# Patient Record
Sex: MALE | Race: WHITE | NOT HISPANIC OR LATINO | URBAN - METROPOLITAN AREA
[De-identification: names, ages, dates, MRNs, and addresses within clinical notes are randomized per-mention and may not be internally consistent; named-entity substitution may affect disease eponyms.]

---

## 2017-11-10 ENCOUNTER — ALLSCRIPTS OFFICE VISIT (OUTPATIENT)
Dept: OTHER | Facility: OTHER | Age: 52
End: 2017-11-10

## 2017-11-10 DIAGNOSIS — Z12.11 ENCOUNTER FOR SCREENING FOR MALIGNANT NEOPLASM OF COLON: ICD-10-CM

## 2017-11-11 NOTE — PROGRESS NOTES
Assessment    1  Tick bite of back, initial encounter (911 4,E906 4) (U16 265Y,T77  XXXA)   2  Screening for colon cancer (V76 51) (Z12 11)   3  Need for tetanus booster (V03 7) (Z23)    Plan  Need for tetanus booster    · Tdap (Adacel)  Given that there was no EM, the tick was on for less than 24 hours, the patient has no symptoms of Lyme we will not treat at this time  I did provide him with a handout on Lyme disease symptoms from the [facility] and he will call if he develops anything at which point I would start him on doxycycline  He was given FIT cards today as he does not want to get a colonoscopy but is due for colon cancer screening  He is aware that if these are positive he will need a colonoscopy  He was also given an updated Adacel today, he did decline a flu vaccine  Discussion/Summary  Possible side effects of new medications were reviewed with the patient/guardian today  The treatment plan was reviewed with the patient/guardian  The patient/guardian understands and agrees with the treatment plan      Chief Complaint  Pt here with deer tick on his left leg  He found it Wednesday night and doesn't believe it was there on Wednesday morning  History of Present Illness  HPI: the pt is here today because he found a tick on his left upper leg 2 days agois almost certain the tick was not there earlier that dayhad a small red kellen where the tick bit himput some cream on it that he had at home and by the next day was gonehas got no symptoms of Lyme diseasewas no EMheadaches or joint painNo fevers or chills      Past Medical History  1  History of Acute Pneumonitis (486)   2  History of Bronchitis, asthmatic (493 90) (J45 909)   3  History of epididymitis (V13 89) (Z87 438)   4  History of pilonidal cyst (V13 3) (Z87 2)   5  History of Spermatocele (608 1) (N43 40)   6  History of Traumatic Pneumothorax (860 0)  Active Problems And Past Medical History Reviewed:    The active problems and past medical history were reviewed and updated today  Family History  Family History    1  Family history of No Significant Family History  Family History Reviewed: The family history was reviewed and updated today  Social History   · Being A Social Drinker   · Current Smoker (305 1)   · Former smoker (O38 40) (T21 582)   · History of Former smoker (V15 82) (Z87 891)   · Nonsmoker (V49 89) (Z78 9)  The social history was reviewed and updated today  Surgical History    1  Denied: History Of Prior Surgery  Surgical History Reviewed: The surgical history was reviewed and updated today  Current Meds   1  Daily Mens Health Formula Oral Tablet; take 1/2 tablet daily; Therapy: 88FCM9010 to Recorded   2  Pantoprazole Sodium 40 MG Oral Tablet Delayed Release; Take 1 tablet daily; Therapy: 60BWO7550 to (Last PO:[id number])  Requested for: 79JQB9283 Ordered   3  Pepcid AC 10 MG Oral Tablet; Therapy: 17NEP0062 to Recorded    The medication list was reviewed and updated today  Allergies  1  No Known Drug Allergies    Vitals   Recorded: 15BHD1048 01:30PM   Temperature 97 9 F   Heart Rate 89   Systolic 642   Diastolic 72   Height 6 ft 1 5 in   Weight 212 lb    BMI Calculated 27 59   BSA Calculated 2 22   O2 Saturation 98       Physical Exam   Constitutional  General appearance: No acute distress, well appearing and well nourished  Skin  Skin and subcutaneous tissue: Normal without rashes or lesions     Psychiatric  Orientation to person, place and time: Normal    Mood and affect: Normal          Signatures   Electronically signed by : NEHA Mendoza ; Nov 10 2017  1:51PM EST                       (Author)

## 2017-12-30 ENCOUNTER — ALLSCRIPTS OFFICE VISIT (OUTPATIENT)
Dept: OTHER | Facility: OTHER | Age: 52
End: 2017-12-30

## 2018-01-02 NOTE — PROGRESS NOTES
Assessment   1  Bacterial sinusitis (473 9,041 9) (J32 9,B96 89)   2  Former smoker (V15 82) (S07 982)   3  Contact dermatitis due to other agent (672 89) (L25 8)    Plan   Bacterial sinusitis    · Cefuroxime Axetil 250 MG Oral Tablet; TAKE 1 TABLET EVERY 12 HOURS DAILY  PMH: History of influenza vaccination    · Fluzone Quadrivalent 0 5 ML Intramuscular Suspension; given today; To Be    Done: [id number]    Discussion/Summary      1) CEFUROXIME 1 TAB TWICE A DAY FLUIDS CORTAID 10- APPLY SMALL AMOUNT ONCE A DAY FOR A WEEK UNTIL RESOLVED OVER THE COUNTER  PREDNISONE 10MG 3 TABS FOR 2 DAYS, 2 TABS FOR 2 DAYS, 1 TAB FOR 2 DAYS FOR BURNING IN CHEST  Possible side effects of new medications were reviewed with the patient/guardian today  The treatment plan was reviewed with the patient/guardian  The patient/guardian understands and agrees with the treatment plan      Chief Complaint   C/O SINUS PRESSURE AND CHEST CONGESTION  COUGH, HA, POST NASAL DRIP  STARTED LAST SATURDAY      History of Present Illness   HPI: SYMPTOMS FOR 1 WEEK   started with head congestion, moved into chest and now back in head again  no fevers  no shortness of breath  some nasal drainage, more post nasal drip   of irritation of skin just beneath left eye  works with chemicals, not sure if he got something on his skin but has had some irritation for over 1 month      Review of Systems        Constitutional: feeling tired, but-- as noted in HPI,-- no fever-- and-- no chills  ENT: earache,-- sore throat-- and-- nasal discharge, but-- as noted in HPI,-- no nosebleeds,-- no hearing loss-- and-- no hoarseness  Cardiovascular: no complaints of slow or fast heart rate, no chest pain, no palpitations, no leg claudication or lower extremity edema  Respiratory: cough, but-- as noted in HPI  Gastrointestinal: no complaints of abdominal pain, no constipation, no nausea or vomiting, no diarrhea or bloody stools        Genitourinary: no complaints of dysuria or incontinence, no hesitancy, no nocturia, no genital lesion, no inadequacy of penile erection  Musculoskeletal: no complaints of arthralgia, no myalgia, no joint swelling or stiffness, no limb pain or swelling  Integumentary: no complaints of skin rash or lesion, no itching or dry skin, no skin wounds  Neurological: no complaints of headache, no confusion, no numbness or tingling, no dizziness or fainting  Active Problems   1  Screening for colon cancer (V76 51) (Z12 11)   2  Tick bite of back, initial encounter (911 4,E906 4) (B08 076N,H33  Sarclintn Cera)    Past Medical History   1  History of Acute Pneumonitis (486)   2  History of Bronchitis, asthmatic (493 90) (J45 909)   3  History of epididymitis (V13 89) (Z87 438)   4  History of pilonidal cyst (V13 3) (Z87 2)   5  History of Spermatocele (608 1) (N43 40)   6  History of Traumatic Pneumothorax (860 0)  Active Problems And Past Medical History Reviewed: The active problems and past medical history were reviewed and updated today  Family History   Family History    1  Family history of No Significant Family History  Family History Reviewed: The family history was reviewed and updated today  Social History    · Being A Social Drinker   · Former smoker (Q76 78) (U68 540)  The social history was reviewed and updated today  The social history was reviewed and is unchanged  Surgical History   1  Denied: History Of Prior Surgery  Surgical History Reviewed: The surgical history was reviewed and updated today  Current Meds    1  Daily Mens Health Formula Oral Tablet; take 1/2 tablet daily; Therapy: 72QIH4366 to Recorded   2  Pantoprazole Sodium 40 MG Oral Tablet Delayed Release; Take 1 tablet daily; Therapy: 87MTC4065 to (Last SV:02NDS9188)  Requested for: 88UEO7229 Ordered   3  Pepcid AC 10 MG Oral Tablet; Therapy: 96LMX5906 to Recorded     The medication list was reviewed and updated today  Allergies   1  No Known Drug Allergies    Vitals    Recorded: 45GCN4360 10:07AM   Temperature 97 8 F   Heart Rate 88   Systolic 041   Diastolic 70   Height 6 ft 1 5 in   Weight 212 lb 8 oz   BMI Calculated 27 66   BSA Calculated 2 22   O2 Saturation 99     Physical Exam        Constitutional      General appearance: No acute distress, well appearing and well nourished  Eyes      Conjunctiva and lids: No swelling, erythema, or discharge  Ears, Nose, Mouth, and Throat      External inspection of ears and nose: Normal        Otoscopic examination: Abnormal  -- dull tm bilaterally without erythema  Nasal mucosa, septum, and turbinates: Abnormal  -- nasal congestion  Oropharynx: Abnormal  -- drainage posterior pharynx  Pulmonary      Auscultation of lungs: Clear to auscultation, equal breath sounds bilaterally, no wheezes, no rales, no rhonci  Cardiovascular      Auscultation of heart: Normal rate and rhythm, normal S1 and S2, without murmurs  Abdomen      Abdomen: Non-tender, no masses  Liver and spleen: No hepatomegaly or splenomegaly         Psychiatric      Orientation to person, place and time: Normal        Mood and affect: Normal           Signatures    Electronically signed by : Manuel Diego DO; Jan 2 2018 12:41AM EST                       (Author)

## 2018-01-09 NOTE — PROGRESS NOTES
Assessment    1  Sinusitis (473 9) (J32 9)    Plan  Sinusitis    · Levofloxacin 500 MG Oral Tablet; Take 1 daily    Discussion/Summary    The patient was given a weaning dose of prednisone provided as samples and talk a prescription for Levaquin to be taken once daily  He is reminded to use saline nasal spray and to carefully watch his hands  Chief Complaint  PT C/O SINUS PRESSURE, HEADACHE WITH DIZZINESS  HE HAS A SEVERE ST AND A PRODUCTIVE COLORED COUGH THAT HAD BLOOD IN IT THIIS AM  HE JUST FINISHED AROUND OF ANTIBIOTICS IN THE BEGINNING OF THE MONTH  PT GIVEN ORDER FOR PFT FOR WHEN HE IS FEELING BETTER  History of Present Illness  HPI: patient is here with complaints of a sinus infection  He was feeling well from his previous infection and was reexposed  He has a very severe sore throat sinus pressure leading to some lightheadedness and discomfort in his years  Review of Systems    Constitutional: no fever or chills, feels well, no tiredness, no recent weight loss or weight gain  ENT: sore throat and nasal discharge  Cardiovascular: no complaints of slow or fast heart rate, no chest pain, no palpitations, no leg claudication or lower extremity edema  Respiratory: no complaints of shortness of breath, no wheezing or cough, no dyspnea on exertion, no orthopnea or PND  Gastrointestinal: no complaints of abdominal pain, no constipation, no nausea or vomiting, no diarrhea or bloody stools  Genitourinary: no complaints of dysuria or incontinence, no hesitancy, no nocturia, no genital lesion, no inadequacy of penile erection  Musculoskeletal: no complaints of arthralgia, no myalgia, no joint swelling or stiffness, no limb pain or swelling  Integumentary: no complaints of skin rash or lesion, no itching or dry skin, no skin wounds  Neurological: no complaints of headache, no confusion, no numbness or tingling, no dizziness or fainting  Active Problems    1   Abdominal pain, epigastric (789 06) (R10 13)   2  Acid reflux (530 81) (K21 9)   3  Bronchitis, asthmatic (493 90) (J45 909)   4  Chronic obstructive pulmonary disease (496) (J44 9)   5  Screening for lipoid disorders (V77 91) (Z13 220)   6  Screening, anemia, deficiency, iron (V78 0) (Z13 0)   7  Sinusitis (473 9) (J32 9)   8  URI (upper respiratory infection) (465 9) (J06 9)   9  Wishing To Stop Smoking    Past Medical History    1  History of Acute Pneumonitis (486)   2  History of Bronchitis, asthmatic (493 90) (J45 909)   3  History of epididymitis (V13 89) (Z87 438)   4  History of pilonidal cyst (V13 3) (Z87 2)   5  History of Spermatocele (608 1) (N43 40)   6  History of Traumatic Pneumothorax (860 0)  Active Problems And Past Medical History Reviewed: The active problems and past medical history were reviewed and updated today  Family History    1  Family history of No Significant Family History  Family History Reviewed: The family history was reviewed and updated today  Social History    · Being A Social Drinker   · Current Smoker (305 1)   · Former smoker (V15 82) (B39 413)   · Wishing To Stop Smoking  The social history was reviewed and updated today  Surgical History    1  Denied: History Of Prior Surgery  Surgical History Reviewed: The surgical history was reviewed and updated today  Current Meds   1  Daily Mens Health Formula Oral Tablet; take 1/2 tablet daily; Therapy: 20DLX4895 to Recorded   2  Pepcid AC 10 MG Oral Tablet; Therapy: 95BGX5383 to Recorded    The medication list was reviewed and updated today  Allergies    1  No Known Drug Allergies    Vitals   Recorded: 11PTT0287 11:13AM   Temperature 98 8 F   Heart Rate 309   Systolic 809   Diastolic 80   Height 6 ft 1 5 in   Weight 211 lb    BMI Calculated 27 46   BSA Calculated 2 21   O2 Saturation 97     Physical Exam    Constitutional   General appearance: No acute distress, well appearing and well nourished      Eyes Conjunctiva and lids: No swelling, erythema, or discharge  Pupils and irises: Equal, round and reactive to light  Ears, Nose, Mouth, and Throat   External inspection of ears and nose: Normal     Otoscopic examination: Tympanic membrance translucent with normal light reflex  Canals patent without erythema  Nasal mucosa, septum, and turbinates: Abnormal   times tenderness  Oropharynx: Normal with no erythema, edema, exudate or lesions  Pulmonary   Respiratory effort: No increased work of breathing or signs of respiratory distress  Auscultation of lungs: Clear to auscultation, equal breath sounds bilaterally, no wheezes, no rales, no rhonci  Cardiovascular   Palpation of heart: Normal PMI, no thrills  Auscultation of heart: Normal rate and rhythm, normal S1 and S2, without murmurs  Examination of extremities for edema and/or varicosities: Normal     Carotid pulses: Normal     Abdomen   Abdomen: Non-tender, no masses  Liver and spleen: No hepatomegaly or splenomegaly  Lymphatic   Palpation of lymph nodes in neck: No lymphadenopathy  Musculoskeletal   Gait and station: Normal     Digits and nails: Normal without clubbing or cyanosis  Inspection/palpation of joints, bones, and muscles: Normal     Skin   Skin and subcutaneous tissue: Normal without rashes or lesions  Neurologic   Cranial nerves: Cranial nerves 2-12 intact  Reflexes: 2+ and symmetric  Sensation: No sensory loss      Psychiatric   Orientation to person, place and time: Normal     Mood and affect: Normal          Signatures   Electronically signed by : Lola Nagy DO; Jan 27 2016 11:35AM EST                       (Author)

## 2018-01-14 VITALS
SYSTOLIC BLOOD PRESSURE: 132 MMHG | HEIGHT: 74 IN | HEART RATE: 89 BPM | TEMPERATURE: 97.9 F | DIASTOLIC BLOOD PRESSURE: 72 MMHG | BODY MASS INDEX: 27.21 KG/M2 | WEIGHT: 212 LBS | OXYGEN SATURATION: 98 %

## 2018-01-23 VITALS
DIASTOLIC BLOOD PRESSURE: 70 MMHG | HEART RATE: 88 BPM | WEIGHT: 212.5 LBS | SYSTOLIC BLOOD PRESSURE: 126 MMHG | TEMPERATURE: 97.8 F | HEIGHT: 74 IN | BODY MASS INDEX: 27.27 KG/M2 | OXYGEN SATURATION: 99 %

## 2022-10-24 ENCOUNTER — OFFICE VISIT (OUTPATIENT)
Dept: FAMILY MEDICINE CLINIC | Facility: CLINIC | Age: 57
End: 2022-10-24

## 2022-10-24 VITALS
SYSTOLIC BLOOD PRESSURE: 118 MMHG | OXYGEN SATURATION: 97 % | TEMPERATURE: 97.6 F | WEIGHT: 210.4 LBS | HEIGHT: 74 IN | HEART RATE: 83 BPM | DIASTOLIC BLOOD PRESSURE: 64 MMHG | BODY MASS INDEX: 27 KG/M2

## 2022-10-24 DIAGNOSIS — M25.561 ACUTE PAIN OF RIGHT KNEE: Primary | ICD-10-CM

## 2022-10-24 RX ORDER — MELOXICAM 15 MG/1
15 TABLET ORAL DAILY
Qty: 30 TABLET | Refills: 1 | Status: SHIPPED | OUTPATIENT
Start: 2022-10-24

## 2022-10-24 RX ORDER — OMEPRAZOLE 20 MG/1
20 TABLET, DELAYED RELEASE ORAL AS NEEDED
COMMUNITY

## 2022-10-24 NOTE — PATIENT INSTRUCTIONS
Warm compresses, exercises and then ice afterward  Meloxicam once daily, cant take tylenol for breakthrough pain- do not take more ibuprofen  Compression sleeve for comfort stability-remove to sleep  Elevate  Work through range of motion slowly  Okay to use elliptical

## 2022-10-24 NOTE — PROGRESS NOTES
Name: Stephen Dai      : 1965      MRN: [id number]  Encounter Provider: PHILLIP Mendez  Encounter Date: 10/24/2022   Encounter department: [place] [address]  Acute pain of right knee  Assessment & Plan:  Warm compresses, exercises and then ice afterward  Meloxicam once daily, cant take tylenol for breakthrough pain- do not take more ibuprofen  Compression sleeve for comfort stability-remove to sleep  Elevate  Work through range of motion slowly  Okay to use elliptical    Orders:  -     meloxicam (Mobic) 15 mg tablet; Take 1 tablet (15 mg total) by mouth daily         Subjective      Chronic right knee pain dating back to his childhood- played sports  No surgeries to knee in past  Works where he is kneeling often or getting up and down from a squat position throughout the day  Aggravated last week had extreme pain over weekend but has improved since he has been icing and taking ibuprofen and elevating knee over the weekend- swelling has gone down but still really stiff  Sometimes feels unstable  Review of Systems   Constitutional: Negative  Respiratory: Negative  Cardiovascular: Negative  Gastrointestinal: Negative  Musculoskeletal: Positive for arthralgias (knee pain) and joint swelling (right knee)  Skin: Negative  Neurological: Negative  Hematological: Negative  Psychiatric/Behavioral: Negative  Current Outpatient Medications on File Prior to Visit   Medication Sig   • Multiple Vitamins-Minerals (MULTIVITAMIN ADULTS PO) Take 0 5 tablets by mouth daily Take 1/2 tablet by mouth daily of men's formulation     • omeprazole (PriLOSEC OTC) 20 MG tablet Take 20 mg by mouth if needed       Objective     /64 (BP Location: Left arm, Patient Position: Sitting, Cuff Size: Large)   Pulse 83   Temp 97 6 °F (36 4 °C) (Tympanic)   Ht 6' 2" (1 88 m)   Wt 95 4 kg (210 lb 6 4 oz)   SpO2 97%   BMI 27 01 kg/m² Physical Exam  Vitals and nursing note reviewed  Constitutional:       Appearance: Normal appearance  He is obese  Cardiovascular:      Rate and Rhythm: Normal rate and regular rhythm  Pulses: Normal pulses  Heart sounds: Normal heart sounds  Pulmonary:      Effort: Pulmonary effort is normal       Breath sounds: Normal breath sounds  Abdominal:      Palpations: Abdomen is soft  Musculoskeletal:      Right knee: Swelling (posterior knee) present  Decreased range of motion  Tenderness present over the MCL and patellar tendon  No LCL laxity, MCL laxity, ACL laxity or PCL laxity  Normal meniscus  Instability Tests: Anterior drawer test negative  Posterior drawer test negative  Anterior Lachman test negative  Medial Paddy test negative and lateral Paddy test negative  Left knee: Normal       Comments: Suspect bakers cyst   Negative homans, negative distal swelling   Skin:     General: Skin is warm and dry  Findings: No rash  Neurological:      Mental Status: He is alert  Psychiatric:         Mood and Affect: Mood normal          Behavior: Behavior normal          Thought Content:  Thought content normal          Judgment: Judgment normal        PHILLIP Garza

## 2022-10-29 PROBLEM — M25.561 ACUTE PAIN OF RIGHT KNEE: Status: ACTIVE | Noted: 2022-10-29

## 2024-11-12 ENCOUNTER — OFFICE VISIT (OUTPATIENT)
Dept: FAMILY MEDICINE CLINIC | Facility: CLINIC | Age: 59
End: 2024-11-12

## 2024-11-12 VITALS
HEIGHT: 74 IN | SYSTOLIC BLOOD PRESSURE: 120 MMHG | BODY MASS INDEX: 26.95 KG/M2 | WEIGHT: 210 LBS | DIASTOLIC BLOOD PRESSURE: 70 MMHG | HEART RATE: 88 BPM | OXYGEN SATURATION: 97 %
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
## 2024-11-12 DIAGNOSIS — S30.861A TICK BITE OF ABDOMEN, INITIAL ENCOUNTER: Primary | ICD-10-CM

## 2024-11-12 PROBLEM — S30.860A TICK BITE OF BACK: Status: RESOLVED | Noted: 2017-11-10 | Resolved: 2024-11-12

## 2024-11-12 PROCEDURE — 99213 OFFICE O/P EST LOW 20 MIN: CPT | Performed by: NURSE PRACTITIONER

## 2024-11-12 RX ORDER — DOXYCYCLINE 100 MG/1
100 TABLET ORAL 2 TIMES DAILY
Qty: 20 TABLET | Refills: 0 | Status: SHIPPED | OUTPATIENT
Start: 2024-11-12 | End: 2024-11-22

## 2024-11-12 NOTE — PROGRESS NOTES
"Ambulatory Visit  Name: Gary García      : 1965      MRN: [id number]  Encounter Provider: PHILLIP Rodríguez  Encounter Date: 2024   Encounter department: [facility]    Assessment & Plan  Tick bite of abdomen, initial encounter  Check body after being outdoors in the woods or doing yard work.  Complete 10 days of antibiotics, sent to the pharmacy.   Monitor for symptoms of lyme  Orders:    doxycycline (ADOXA) 100 MG tablet; Take 1 tablet (100 mg total) by mouth 2 (two) times a day for 10 days              History of Present Illness     Pt is here today because he has 2 tick bites. He went hunting on Saturday and on  morning he found a total of 4 ticks on him, 3 wood ticks and 1 deer tick. 2 were biting and the other two were not. He has not taken anything for this. He does not have any symptoms other than the marks where they were located.     No fevers, fatigue, or joint pains. He has been treated for lyme in the past.         History obtained from : patient  Review of Systems   Constitutional:  Negative for chills, fatigue and fever.   HENT: Negative.     Eyes: Negative.    Respiratory: Negative.     Cardiovascular: Negative.    Gastrointestinal: Negative.    Genitourinary: Negative.    Musculoskeletal: Negative.  Negative for arthralgias.   Skin:  Positive for wound (very small scabs on two sites of bites.).   Neurological: Negative.  Negative for headaches.           Objective     /70   Pulse 88   Ht 6' 2\" (1.88 m)   Wt 95.3 kg (210 lb)   SpO2 97%   BMI 26.96 kg/m²     Physical Exam  Constitutional:       General: He is not in acute distress.     Appearance: Normal appearance.   HENT:      Head: Normocephalic.   Cardiovascular:      Rate and Rhythm: Normal rate.      Heart sounds: Normal heart sounds.   Pulmonary:      Effort: Pulmonary effort is normal.      Breath sounds: Normal breath sounds.   Abdominal:      Palpations: Abdomen is soft. "   Lymphadenopathy:      Cervical: No cervical adenopathy.   Skin:     Findings: Bruising (appears to be bruising around one bite on left abdomen, does not resemble bullseye) present.   Neurological:      General: No focal deficit present.      Mental Status: He is alert and oriented to person, place, and time.